# Patient Record
Sex: FEMALE | Race: BLACK OR AFRICAN AMERICAN | URBAN - METROPOLITAN AREA
[De-identification: names, ages, dates, MRNs, and addresses within clinical notes are randomized per-mention and may not be internally consistent; named-entity substitution may affect disease eponyms.]

---

## 2017-12-28 ENCOUNTER — HOSPITAL ENCOUNTER (OUTPATIENT)
Facility: HOSPITAL | Age: 22
Setting detail: OBSERVATION
Discharge: HOME | End: 2017-12-28
Attending: OBSTETRICS & GYNECOLOGY | Admitting: OBSTETRICS & GYNECOLOGY

## 2017-12-28 ENCOUNTER — APPOINTMENT (OUTPATIENT)
Dept: ULTRASOUND IMAGING | Facility: HOSPITAL | Age: 22
End: 2017-12-28
Attending: OBSTETRICS & GYNECOLOGY

## 2017-12-28 VITALS
BODY MASS INDEX: 28.28 KG/M2 | SYSTOLIC BLOOD PRESSURE: 137 MMHG | HEART RATE: 87 BPM | WEIGHT: 176 LBS | RESPIRATION RATE: 17 BRPM | HEIGHT: 66 IN | DIASTOLIC BLOOD PRESSURE: 77 MMHG | TEMPERATURE: 98 F

## 2017-12-28 PROBLEM — R10.9 ABDOMINAL PAIN AFFECTING PREGNANCY: Status: ACTIVE | Noted: 2017-12-28

## 2017-12-28 PROBLEM — O26.899 ABDOMINAL PAIN AFFECTING PREGNANCY: Status: ACTIVE | Noted: 2017-12-28

## 2017-12-28 PROBLEM — Z34.90 PREGNANCY: Status: ACTIVE | Noted: 2017-12-28

## 2017-12-28 PROCEDURE — 87081 CULTURE SCREEN ONLY: CPT | Performed by: OBSTETRICS & GYNECOLOGY

## 2017-12-28 PROCEDURE — 76815 OB US LIMITED FETUS(S): CPT

## 2017-12-28 PROCEDURE — 96361 HYDRATE IV INFUSION ADD-ON: CPT

## 2017-12-28 PROCEDURE — 96360 HYDRATION IV INFUSION INIT: CPT

## 2017-12-28 PROCEDURE — 99203 OFFICE O/P NEW LOW 30 MIN: CPT

## 2017-12-28 PROCEDURE — 84112 EVAL AMNIOTIC FLUID PROTEIN: CPT | Performed by: OBSTETRICS & GYNECOLOGY

## 2017-12-28 PROCEDURE — 59025 FETAL NON-STRESS TEST: CPT

## 2017-12-28 PROCEDURE — 76816 OB US FOLLOW-UP PER FETUS: CPT | Performed by: OBSTETRICS & GYNECOLOGY

## 2017-12-28 RX ORDER — SODIUM CHLORIDE 0.9 % (FLUSH) 0.9 %
10 SYRINGE (ML) INJECTION AS NEEDED
Status: DISCONTINUED | OUTPATIENT
Start: 2017-12-28 | End: 2017-12-29

## 2017-12-28 RX ORDER — SODIUM CHLORIDE, SODIUM LACTATE, POTASSIUM CHLORIDE, CALCIUM CHLORIDE 600; 310; 30; 20 MG/100ML; MG/100ML; MG/100ML; MG/100ML
INJECTION, SOLUTION INTRAVENOUS
Status: COMPLETED
Start: 2017-12-28 | End: 2017-12-28

## 2017-12-28 NOTE — PROGRESS NOTES
Received  pt in triage from [facility] ambulance. Pt states she receives her prenatal care at [facility] with Dr. Junior Schmitz. Pt states she last saw her doctor at the end of November. Pt states she has had ctx that started today at 1300.  Pt states sh

## 2017-12-29 NOTE — TRIAGE
[facility]      Triage Note    [facility] Patient Status:  Observation    1995 MRN [id number]   Location [address]  Attending Esther Rose MD   Hosp Day # 0 PCP No primary care provider on fi count sheet.     Reason for visit: R/o rupture      Johny Hansen RN  12/28/2017 10:54 PM

## 2017-12-29 NOTE — PROGRESS NOTES
Pt reports that she was admitted to [facility] for spotting in October. Pt states she was told the placenta was by the cervix. Pt has no complaint of bleeding today. Dr. Lang Sifuentes called, Orders received for speculum exam to proceed and pt to have ultrasound.

## 2017-12-29 NOTE — PROGRESS NOTES
US KEDAR repeated  KEDAR 9.99 ( 5.6/2.35/2.04)    No c/o  FHTs 130s, reactive. UCs none  SVE 1/50%/high/vtx  Home with f/u inst as scheduled with Dr Gloria Beaver, ER f/u visit info for my office given as well. Rx Diclegis given for nausea. FM/labor inst reviewed.

## 2017-12-29 NOTE — PROGRESS NOTES
Pt arrived on [facility] w/cc leakage of fluid x 1 week and [facility]. Hasn't had appt there x 1 mth, but states she has appt scheduled on 1/3/18. Pt not sure but thinks she was told her placenta was coverig her cervix. AVSS A&O x 3, NAD.